# Patient Record
Sex: MALE
[De-identification: names, ages, dates, MRNs, and addresses within clinical notes are randomized per-mention and may not be internally consistent; named-entity substitution may affect disease eponyms.]

---

## 2022-04-19 PROBLEM — Z00.00 ENCOUNTER FOR PREVENTIVE HEALTH EXAMINATION: Status: ACTIVE | Noted: 2022-04-19

## 2022-04-26 ENCOUNTER — APPOINTMENT (OUTPATIENT)
Dept: ORTHOPEDIC SURGERY | Facility: CLINIC | Age: 42
End: 2022-04-26
Payer: OTHER MISCELLANEOUS

## 2022-04-26 PROCEDURE — 99213 OFFICE O/P EST LOW 20 MIN: CPT

## 2022-04-26 PROCEDURE — 99072 ADDL SUPL MATRL&STAF TM PHE: CPT

## 2022-04-26 NOTE — DISCUSSION/SUMMARY
[de-identified] : I reviewed patient's prior imaging and discussed his condition and treatment course.  Continue PT and HEP for strengthening.  Patient voiced understanding and agreement with the plan.\par

## 2022-04-26 NOTE — HISTORY OF PRESENT ILLNESS
[de-identified] : Patient presents for f/u R knee pain. Patient states his knee is getting better. He's been going to PT, which is definitely helping.  [FreeTextEntry3] : 8/16/2021

## 2022-06-08 ENCOUNTER — APPOINTMENT (OUTPATIENT)
Dept: ORTHOPEDIC SURGERY | Facility: CLINIC | Age: 42
End: 2022-06-08
Payer: OTHER MISCELLANEOUS

## 2022-06-08 PROCEDURE — 99072 ADDL SUPL MATRL&STAF TM PHE: CPT

## 2022-06-08 PROCEDURE — 99213 OFFICE O/P EST LOW 20 MIN: CPT

## 2022-06-08 NOTE — DISCUSSION/SUMMARY
[de-identified] : I discussed his condition and treatment options.  Continue PT.  Patient voiced understanding and agreement with the plan.\par

## 2022-06-08 NOTE — HISTORY OF PRESENT ILLNESS
[de-identified] : Patient presents for follow up RT knee. Last seen 4/26/22 since last visit patient states knee is feeling good. He reports pain with change in weather or going up and down stairs, complains of clicking in the knee at times. Patient is awaiting approval for additional PT.  He last attended PT 2 weeks ago.

## 2022-08-21 ENCOUNTER — FORM ENCOUNTER (OUTPATIENT)
Age: 42
End: 2022-08-21

## 2022-09-27 ENCOUNTER — APPOINTMENT (OUTPATIENT)
Dept: ORTHOPEDIC SURGERY | Facility: CLINIC | Age: 42
End: 2022-09-27

## 2022-09-27 DIAGNOSIS — S82.191D OTHER FRACTURE OF UPPER END OF RIGHT TIBIA, SUBSEQUENT ENCOUNTER FOR CLOSED FRACTURE WITH ROUTINE HEALING: ICD-10-CM

## 2022-09-27 PROCEDURE — 99213 OFFICE O/P EST LOW 20 MIN: CPT

## 2022-09-27 PROCEDURE — 99072 ADDL SUPL MATRL&STAF TM PHE: CPT

## 2022-09-27 NOTE — PHYSICAL EXAM
[Right] : right knee [5___] : hamstring 5[unfilled]/5 [] : patient ambulates without assistive device [TWNoteComboBox7] : flexion 120 degrees [de-identified] : extension 0 degrees

## 2022-09-27 NOTE — HISTORY OF PRESENT ILLNESS
[de-identified] : Patient presents for follow up RT knee. Since last visit, patient states he has completed PT. Patient states that PT has helped but still has some bad days. If he walks too much, he has some knee pain at the end of the day.  Otherwise, he has resumed usual activities.

## 2022-09-27 NOTE — DISCUSSION/SUMMARY
[de-identified] : I discussed his condition and treatment course.  Discontinue further sessions of PT.  Resume activities as tolerated.  Follow up as needed.  Patient voiced understanding and agreement with the plan.\par

## 2023-05-27 ENCOUNTER — NON-APPOINTMENT (OUTPATIENT)
Age: 43
End: 2023-05-27

## 2023-07-18 ENCOUNTER — APPOINTMENT (OUTPATIENT)
Age: 43
End: 2023-07-18

## 2024-12-17 ENCOUNTER — APPOINTMENT (OUTPATIENT)
Dept: PEDIATRIC ALLERGY IMMUNOLOGY | Facility: CLINIC | Age: 44
End: 2024-12-17
Payer: COMMERCIAL

## 2024-12-17 ENCOUNTER — LABORATORY RESULT (OUTPATIENT)
Age: 44
End: 2024-12-17

## 2024-12-17 VITALS — OXYGEN SATURATION: 70 % | WEIGHT: 237.4 LBS | HEART RATE: 96 BPM

## 2024-12-17 DIAGNOSIS — T41.45XA ADVERSE EFFECT OF UNSPECIFIED ANESTHETIC, INITIAL ENCOUNTER: ICD-10-CM

## 2024-12-17 DIAGNOSIS — Z87.891 PERSONAL HISTORY OF NICOTINE DEPENDENCE: ICD-10-CM

## 2024-12-17 DIAGNOSIS — T63.91XS: ICD-10-CM

## 2024-12-17 DIAGNOSIS — Z87.892 PERSONAL HISTORY OF ANAPHYLAXIS: ICD-10-CM

## 2024-12-17 DIAGNOSIS — Z88.0 ALLERGY STATUS TO PENICILLIN: ICD-10-CM

## 2024-12-17 DIAGNOSIS — T78.1XXS OTHER ADVERSE FOOD REACTIONS, NOT ELSEWHERE CLASSIFIED, SEQUELA: ICD-10-CM

## 2024-12-17 DIAGNOSIS — R09.82 POSTNASAL DRIP: ICD-10-CM

## 2024-12-17 PROCEDURE — 99203 OFFICE O/P NEW LOW 30 MIN: CPT | Mod: 25

## 2024-12-17 PROCEDURE — 95004 PERQ TESTS W/ALRGNC XTRCS: CPT

## 2024-12-17 PROCEDURE — 36415 COLL VENOUS BLD VENIPUNCTURE: CPT

## 2024-12-17 PROCEDURE — 95018 ALL TSTG PERQ&IQ DRUGS/BIOL: CPT

## 2024-12-19 PROBLEM — T63.91XS: Status: ACTIVE | Noted: 2024-12-19

## 2024-12-19 PROBLEM — R09.82 POSTNASAL DRIP: Status: ACTIVE | Noted: 2024-12-19

## 2024-12-19 PROBLEM — T78.1XXS ADVERSE FOOD REACTION, SEQUELA: Status: ACTIVE | Noted: 2024-12-19

## 2024-12-19 LAB
DEPRECATED LTX IGE RAST QL: 0
LTX IGE QN: <0.1 KUA/L
TRYPTASE: 4.8 UG/L

## 2025-01-21 ENCOUNTER — APPOINTMENT (OUTPATIENT)
Dept: PEDIATRIC ALLERGY IMMUNOLOGY | Facility: CLINIC | Age: 45
End: 2025-01-21
Payer: COMMERCIAL

## 2025-01-21 VITALS — DIASTOLIC BLOOD PRESSURE: 70 MMHG | HEART RATE: 63 BPM | SYSTOLIC BLOOD PRESSURE: 124 MMHG | OXYGEN SATURATION: 97 %

## 2025-01-21 VITALS — HEART RATE: 60 BPM | SYSTOLIC BLOOD PRESSURE: 124 MMHG | OXYGEN SATURATION: 96 % | DIASTOLIC BLOOD PRESSURE: 72 MMHG

## 2025-01-21 VITALS — SYSTOLIC BLOOD PRESSURE: 122 MMHG | OXYGEN SATURATION: 98 % | HEART RATE: 63 BPM | DIASTOLIC BLOOD PRESSURE: 79 MMHG

## 2025-01-21 VITALS
HEIGHT: 72 IN | WEIGHT: 240.25 LBS | HEART RATE: 70 BPM | SYSTOLIC BLOOD PRESSURE: 122 MMHG | BODY MASS INDEX: 32.54 KG/M2 | DIASTOLIC BLOOD PRESSURE: 84 MMHG | OXYGEN SATURATION: 95 %

## 2025-01-21 VITALS — HEART RATE: 58 BPM | DIASTOLIC BLOOD PRESSURE: 82 MMHG | OXYGEN SATURATION: 96 % | SYSTOLIC BLOOD PRESSURE: 131 MMHG

## 2025-01-21 DIAGNOSIS — T41.45XA ADVERSE EFFECT OF UNSPECIFIED ANESTHETIC, INITIAL ENCOUNTER: ICD-10-CM

## 2025-01-21 DIAGNOSIS — T41.45XD ADVERSE EFFECT OF UNSPECIFIED ANESTHETIC, SUBSEQUENT ENCOUNTER: ICD-10-CM

## 2025-01-21 DIAGNOSIS — Z87.892 PERSONAL HISTORY OF ANAPHYLAXIS: ICD-10-CM

## 2025-01-21 DIAGNOSIS — Z01.89 ENCOUNTER FOR OTHER SPECIFIED SPECIAL EXAMINATIONS: ICD-10-CM

## 2025-01-21 DIAGNOSIS — Z88.0 ALLERGY STATUS TO PENICILLIN: ICD-10-CM

## 2025-01-21 DIAGNOSIS — T37.8X5A ADVERSE EFFECT OF OTHER SPECIFIED SYSTEMIC ANTI-INFECTIVES AND ANTIPARASITICS, INITIAL ENCOUNTER: ICD-10-CM

## 2025-01-21 PROCEDURE — 95076 INGEST CHALLENGE INI 120 MIN: CPT

## 2025-01-21 PROCEDURE — 95018 ALL TSTG PERQ&IQ DRUGS/BIOL: CPT

## 2025-01-21 PROCEDURE — 96372 THER/PROPH/DIAG INJ SC/IM: CPT

## 2025-01-21 PROCEDURE — 99215 OFFICE O/P EST HI 40 MIN: CPT | Mod: 25

## 2025-01-24 PROBLEM — T37.8X5A ADVERSE EFFECT OF QUINOLONE: Status: ACTIVE | Noted: 2025-01-24

## 2025-01-24 PROBLEM — Z01.89 ENCOUNTER FOR DRUG CHALLENGE: Status: ACTIVE | Noted: 2025-01-24
